# Patient Record
Sex: FEMALE | Race: WHITE | Employment: OTHER | URBAN - METROPOLITAN AREA
[De-identification: names, ages, dates, MRNs, and addresses within clinical notes are randomized per-mention and may not be internally consistent; named-entity substitution may affect disease eponyms.]

---

## 2020-08-12 ENCOUNTER — HOSPITAL ENCOUNTER (EMERGENCY)
Facility: CLINIC | Age: 75
Discharge: HOME | End: 2020-08-12
Attending: EMERGENCY MEDICINE
Payer: COMMERCIAL

## 2020-08-12 VITALS
OXYGEN SATURATION: 99 % | WEIGHT: 136 LBS | SYSTOLIC BLOOD PRESSURE: 146 MMHG | RESPIRATION RATE: 18 BRPM | BODY MASS INDEX: 25.68 KG/M2 | HEART RATE: 96 BPM | TEMPERATURE: 98.4 F | HEIGHT: 61 IN | DIASTOLIC BLOOD PRESSURE: 65 MMHG

## 2020-08-12 PROCEDURE — 99282 EMERGENCY DEPT VISIT SF MDM: CPT

## 2020-08-12 PROCEDURE — 6370000000 HC RX 637 (ALT 250 FOR IP): Performed by: EMERGENCY MEDICINE

## 2020-08-12 RX ORDER — PREDNISONE 10 MG/1
TABLET ORAL
Qty: 20 TABLET | Refills: 0 | Status: SHIPPED | OUTPATIENT
Start: 2020-08-12 | End: 2020-08-22

## 2020-08-12 RX ORDER — PREDNISONE 20 MG/1
60 TABLET ORAL ONCE
Status: COMPLETED | OUTPATIENT
Start: 2020-08-12 | End: 2020-08-12

## 2020-08-12 RX ADMIN — PREDNISONE 60 MG: 20 TABLET ORAL at 23:14

## 2020-08-13 NOTE — ED PROVIDER NOTES
eMERGENCY dEPARTMENT eNCOUnter      Pt Name: Marie Suazo  MRN: [id number]  [place] 1945  Date of evaluation: 8/12/2020      CHIEF COMPLAINT       Chief Complaint   Patient presents with    Urticaria         HISTORY OF PRESENT ILLNESS    Marie Suazo is a 76 y.o. female who presents with hives. Patient states she was out camping. She started developing hives about 2 AM the progressively got worse. They covering most of her arms and trunk. She denies any difficulty breathing or swallowing. Complains of itching. Has taken Benadryl without success         REVIEW OF SYSTEMS       , Positive itching, rash. Negative for difficulty breathing or swallowing     PAST MEDICAL HISTORY    has a past medical history of Spinal stenosis. SURGICAL HISTORY      has a past surgical history that includes rhinoplasty and Rotator cuff repair. CURRENT MEDICATIONS       Discharge Medication List as of 8/12/2020 11:19 PM          ALLERGIES     has No Known Allergies. FAMILY HISTORY     has no family status information on file. family history is not on file. SOCIAL HISTORY      reports that she has never smoked. She has never used smokeless tobacco. She reports current alcohol use. PHYSICAL EXAM     INITIAL VITALS:  height is 5' 1\" (1.549 m) and weight is 61.7 kg (136 lb). Her temperature is 98.4 °F (36.9 °C). Her blood pressure is 146/65 (abnormal) and her pulse is 96. Her respiration is 18 and oxygen saturation is 99%. Gen.: Patient is well-hydrated, nontoxic range female, no apparent distress. HEENT: Head is atraumatic. Conjunctiva are clear. Oromucosa is pink and moist.  Posterior pharynx without erythema, exudate, or airway compromise. No pooling of secretions. Neck: Supple. Respiratory: Lung sounds are clear bilateral.  Cardiac: Heart is regular rate and rhythm. Skin: Patient has large, slightly raised patches consistent with hives on arms and trunk.     DIFFERENTIAL DIAGNOSIS/ MDM:     Hives    DIAGNOSTIC RESULTS       RADIOLOGY:   I directly visualized the following  images and reviewed the radiologist interpretations:  No orders to display         LABS:  Labs Reviewed - No data to display      EMERGENCY DEPARTMENT COURSE:   Vitals:    Vitals:    08/12/20 2254   BP: (!) 146/65   Pulse: 96   Resp: 18   Temp: 98.4 °F (36.9 °C)   SpO2: 99%   Weight: 61.7 kg (136 lb)   Height: 5' 1\" (1.549 m)     -------------------------  BP: (!) 146/65, Temp: 98.4 °F (36.9 °C), Pulse: 96, Resp: 18    Orders Placed This Encounter   Medications    predniSONE (DELTASONE) tablet 60 mg    predniSONE (DELTASONE) 10 MG tablet     Sig: Take 4 tablets by mouth once daily for 5 days     Dispense:  20 tablet     Refill:  0           Re-evaluation Notes    Patient is in no acute respiratory distress. We will treat patient with a short course of steroids have her follow-up as directed. Return if worse        FINAL IMPRESSION      1. Urticaria          DISPOSITION/PLAN   DISPOSITION Decision To Discharge 08/12/2020 11:10:40 PM      Condition on Disposition    Stable    PATIENT REFERRED TO:  No follow-up provider specified. DISCHARGE MEDICATIONS:  Discharge Medication List as of 8/12/2020 11:19 PM      START taking these medications    Details   predniSONE (DELTASONE) 10 MG tablet Take 4 tablets by mouth once daily for 5 days, Disp-20 tablet,R-0Print             (Please note that portions of this note were completed with a voice recognition program.  Efforts were made to edit the dictations but occasionally words are mis-transcribed.)    Colie Baumgarten,, MD, F.A.C.E.P.   Attending Emergency Physician        Colie Baumgarten, MD  08/13/20 3111